# Patient Record
Sex: FEMALE | Race: WHITE
[De-identification: names, ages, dates, MRNs, and addresses within clinical notes are randomized per-mention and may not be internally consistent; named-entity substitution may affect disease eponyms.]

---

## 2017-08-21 ENCOUNTER — HOSPITAL ENCOUNTER (OUTPATIENT)
Dept: HOSPITAL 75 - RAD | Age: 27
End: 2017-08-21
Attending: UROLOGY
Payer: COMMERCIAL

## 2017-08-21 DIAGNOSIS — N20.0: Primary | ICD-10-CM

## 2017-08-21 PROCEDURE — 74000: CPT

## 2017-08-21 NOTE — DIAGNOSTIC IMAGING REPORT
EXAMINATION: Supine abdomen at 3:23 p.m.



INDICATION: Right renal stone.



COMPARISON: There are no prior studies available for comparison.



TECHNIQUE: Two supine views of the abdomen were obtained.



FINDINGS: There is no evidence for a calculus overlying either

kidney. However, both kidneys are partially obscured by bowel gas

and fecal material. There are a few calcific densities in the

pelvis. This includes a 3 mm calcification in the region of the

ureterovesical junction on the right. Whether this calcification

is related to a ureteral stone or to a phlebolith is not certain.

If previous studies are available, they would be helpful for

comparison. If there are no prior exams and further imaging is

desired, then CT would be recommended.



There is no mass or organomegaly appreciated. The osseous

structures are intact.



IMPRESSION:

1. The small calcification low in the pelvis on the right is of

uncertain etiology. Considerations and recommendations as above.

2. There is no evidence for nephrolithiasis but the kidneys are

partially obscured by bowel gas and fecal material.



Dictated by: 



  Dictated on workstation # [id number]

## 2017-08-25 ENCOUNTER — HOSPITAL ENCOUNTER (OUTPATIENT)
Dept: HOSPITAL 75 - PREOP | Age: 27
End: 2017-08-25
Attending: UROLOGY
Payer: COMMERCIAL

## 2017-08-25 VITALS — HEIGHT: 66 IN | BODY MASS INDEX: 22.98 KG/M2 | WEIGHT: 143 LBS

## 2017-08-25 DIAGNOSIS — Z01.818: Primary | ICD-10-CM

## 2017-08-25 DIAGNOSIS — N20.2: ICD-10-CM

## 2017-08-29 ENCOUNTER — HOSPITAL ENCOUNTER (OUTPATIENT)
Dept: HOSPITAL 75 - SDC | Age: 27
Discharge: HOME | End: 2017-08-29
Attending: UROLOGY
Payer: COMMERCIAL

## 2017-08-29 VITALS — SYSTOLIC BLOOD PRESSURE: 108 MMHG | DIASTOLIC BLOOD PRESSURE: 53 MMHG

## 2017-08-29 VITALS — DIASTOLIC BLOOD PRESSURE: 68 MMHG | SYSTOLIC BLOOD PRESSURE: 116 MMHG

## 2017-08-29 VITALS — BODY MASS INDEX: 22.98 KG/M2 | WEIGHT: 143 LBS | HEIGHT: 66 IN

## 2017-08-29 VITALS — SYSTOLIC BLOOD PRESSURE: 112 MMHG | DIASTOLIC BLOOD PRESSURE: 59 MMHG

## 2017-08-29 VITALS — DIASTOLIC BLOOD PRESSURE: 52 MMHG | SYSTOLIC BLOOD PRESSURE: 96 MMHG

## 2017-08-29 VITALS — DIASTOLIC BLOOD PRESSURE: 53 MMHG | SYSTOLIC BLOOD PRESSURE: 108 MMHG

## 2017-08-29 DIAGNOSIS — N20.2: Primary | ICD-10-CM

## 2017-08-29 PROCEDURE — 87081 CULTURE SCREEN ONLY: CPT

## 2017-08-29 PROCEDURE — 84703 CHORIONIC GONADOTROPIN ASSAY: CPT

## 2017-08-29 PROCEDURE — 74000: CPT

## 2017-08-29 RX ADMIN — PROMETHAZINE HYDROCHLORIDE PRN MG: 25 INJECTION INTRAMUSCULAR; INTRAVENOUS at 09:06

## 2017-08-29 RX ADMIN — PROMETHAZINE HYDROCHLORIDE PRN MG: 25 INJECTION INTRAMUSCULAR; INTRAVENOUS at 09:15

## 2017-08-29 NOTE — OPERATIVE REPORT
DATE OF SERVICE:  08/29/2017



PREOPERATIVE DIAGNOSES: 

1.  Right renal stone. 

2.  Possible right distal ureteral stone. 



POSTOPERATIVE DIAGNOSES: 

1.  Right renal stone. 

2.  Possible right distal ureteral stone. 



OPERATION PERFORMED:

Right ureteroscopy and right ESWL.



SURGEON:

Elias Tawil, MD 



ANESTHESIA:

General.



COMPLICATIONS: 

None. 



PROCEDURE: 

Under satisfactory general anesthesia, the patient in a comfortable position on

the cystoscopy table, the genitalia were prepped and draped in the usual sterile

fashion. The cystoscope was introduced under direct vision. The cystoscopy was

negative. Using the foreoblique lens, I dilated the right ureteral artery in the

middle portion to accommodate the 6.9 Egyptian semirigid ureteroscope, went up to

the proximal ureter, back and down and there were no stones found. The

ureteroscope was then removed. The cystoscope  were used to empty the

bladder and the patient was moved to the ESWL bed, supine. 



The right renal stone localized. Shocks were delivered at 1500, kV of 5 until we

could not visualize the stone. The patient received 30 mg of Toradol and 40 mg

of Lasix at the end of the procedure. She tolerated the procedure and anesthesia

well and was sent to the recovery room in stable condition.





Job ID: [id number]

DocumentID: [id number]

Dictated Date:  08/29/2017 08:39:14

Transcription Date: 08/29/2017 08:54:44

Dictated By: ELIAS TAWIL, MD

[facility]

## 2017-08-29 NOTE — PROGRESS NOTE-POST OPERATIVE
Post-Operative Progess Note


Surgeon (s)/Assistant (s)


Surgeon


ELIAS TAWIL MD


Assistant:  N/A





Pre-Operative Diagnosis


RT RENAL STONE, POSSIBLE RT URETERAL STONE





Post-Operative Diagnosis





SAME





Procedure & Operative Findings


Date of Procedure


8/29/17


Procedure Performed/Findings


RT URETEROSCOPY AND RT ESWL


Anesthesia Type


GENERAL





Estimated Blood Loss


Estimated blood loss (mL):  N/A





Specimens/Packing


Specimens Removed


N/A


Packing:  


N/A











TAWIL,ELIAS A MD Aug 29, 2017 8:19 am

## 2017-08-29 NOTE — DIAGNOSTIC IMAGING REPORT
INDICATION: Lithotripsy



COMPARISON: 8/21/2017



FINDINGS: A single frontal view of the abdomen is obtained. There

are faint calcifications over the mid to upper pole of the right

kidney which is similar to the prior study. A 2 mm calcification

in the right pelvis could represent a phlebolith or could be

within the distal right ureter. No additional urinary tract

calcifications are suspected. There is scattered colonic gas and

stool. The bowel gas pattern is otherwise unremarkable. The

osseous structures appear unremarkable.



IMPRESSION: Overall no interval change. There are a couple of

calcifications seen over the right kidney again which are

suspicious for renal stones. Indeterminate 2 mm calcification

right pelvis is unchanged and could be a phlebolith or within the

distal right ureter. No significant interval change or new

abnormality is seen when compared to the recent prior exam.



Dictated by: 



  Dictated on workstation # [id number]

## 2017-08-29 NOTE — DISCHARGE INST-UROLOGY
Discharge Inst-Urology


Discharge Medications


New, Converted, or Re-newed RX:  RX on Chart





Patient Instructions/Follow Up


Plan


Please make appointment to been seen in office in 4 weeks.





Post ESWL instructions





Increase oral fluids for 48 hours and then as needed.





Diet and Activity as tolerated.





If questions or concerns contact your physician 


Or seek help at emergency department.











TAWIL,ELIAS A MD Aug 29, 2017 8:17 am

## 2019-01-17 ENCOUNTER — HOSPITAL ENCOUNTER (INPATIENT)
Dept: HOSPITAL 75 - LDRP | Age: 29
LOS: 2 days | Discharge: HOME | End: 2019-01-19
Attending: FAMILY MEDICINE | Admitting: FAMILY MEDICINE
Payer: COMMERCIAL

## 2019-01-17 VITALS — DIASTOLIC BLOOD PRESSURE: 60 MMHG | SYSTOLIC BLOOD PRESSURE: 116 MMHG

## 2019-01-17 VITALS — SYSTOLIC BLOOD PRESSURE: 118 MMHG | DIASTOLIC BLOOD PRESSURE: 64 MMHG

## 2019-01-17 VITALS — SYSTOLIC BLOOD PRESSURE: 115 MMHG | DIASTOLIC BLOOD PRESSURE: 56 MMHG

## 2019-01-17 VITALS — DIASTOLIC BLOOD PRESSURE: 61 MMHG | SYSTOLIC BLOOD PRESSURE: 127 MMHG

## 2019-01-17 VITALS — SYSTOLIC BLOOD PRESSURE: 119 MMHG | DIASTOLIC BLOOD PRESSURE: 58 MMHG

## 2019-01-17 VITALS — SYSTOLIC BLOOD PRESSURE: 111 MMHG | DIASTOLIC BLOOD PRESSURE: 58 MMHG

## 2019-01-17 VITALS — SYSTOLIC BLOOD PRESSURE: 111 MMHG | DIASTOLIC BLOOD PRESSURE: 56 MMHG

## 2019-01-17 VITALS — SYSTOLIC BLOOD PRESSURE: 120 MMHG | DIASTOLIC BLOOD PRESSURE: 75 MMHG

## 2019-01-17 VITALS — DIASTOLIC BLOOD PRESSURE: 63 MMHG | SYSTOLIC BLOOD PRESSURE: 117 MMHG

## 2019-01-17 VITALS — SYSTOLIC BLOOD PRESSURE: 116 MMHG | DIASTOLIC BLOOD PRESSURE: 58 MMHG

## 2019-01-17 VITALS — SYSTOLIC BLOOD PRESSURE: 124 MMHG | DIASTOLIC BLOOD PRESSURE: 71 MMHG

## 2019-01-17 VITALS — DIASTOLIC BLOOD PRESSURE: 59 MMHG | SYSTOLIC BLOOD PRESSURE: 110 MMHG

## 2019-01-17 VITALS — DIASTOLIC BLOOD PRESSURE: 61 MMHG | SYSTOLIC BLOOD PRESSURE: 130 MMHG

## 2019-01-17 VITALS — DIASTOLIC BLOOD PRESSURE: 61 MMHG | SYSTOLIC BLOOD PRESSURE: 104 MMHG

## 2019-01-17 VITALS — WEIGHT: 172 LBS | BODY MASS INDEX: 27.64 KG/M2 | HEIGHT: 66 IN

## 2019-01-17 VITALS — DIASTOLIC BLOOD PRESSURE: 55 MMHG | SYSTOLIC BLOOD PRESSURE: 108 MMHG

## 2019-01-17 VITALS — DIASTOLIC BLOOD PRESSURE: 63 MMHG | SYSTOLIC BLOOD PRESSURE: 115 MMHG

## 2019-01-17 VITALS — SYSTOLIC BLOOD PRESSURE: 145 MMHG | DIASTOLIC BLOOD PRESSURE: 73 MMHG

## 2019-01-17 VITALS — SYSTOLIC BLOOD PRESSURE: 115 MMHG | DIASTOLIC BLOOD PRESSURE: 68 MMHG

## 2019-01-17 VITALS — DIASTOLIC BLOOD PRESSURE: 60 MMHG | SYSTOLIC BLOOD PRESSURE: 105 MMHG

## 2019-01-17 VITALS — DIASTOLIC BLOOD PRESSURE: 67 MMHG | SYSTOLIC BLOOD PRESSURE: 118 MMHG

## 2019-01-17 VITALS — SYSTOLIC BLOOD PRESSURE: 125 MMHG | DIASTOLIC BLOOD PRESSURE: 69 MMHG

## 2019-01-17 VITALS — DIASTOLIC BLOOD PRESSURE: 80 MMHG | SYSTOLIC BLOOD PRESSURE: 125 MMHG

## 2019-01-17 VITALS — DIASTOLIC BLOOD PRESSURE: 57 MMHG | SYSTOLIC BLOOD PRESSURE: 109 MMHG

## 2019-01-17 VITALS — DIASTOLIC BLOOD PRESSURE: 64 MMHG | SYSTOLIC BLOOD PRESSURE: 118 MMHG

## 2019-01-17 VITALS — SYSTOLIC BLOOD PRESSURE: 109 MMHG | DIASTOLIC BLOOD PRESSURE: 58 MMHG

## 2019-01-17 VITALS — DIASTOLIC BLOOD PRESSURE: 84 MMHG | SYSTOLIC BLOOD PRESSURE: 119 MMHG

## 2019-01-17 VITALS — SYSTOLIC BLOOD PRESSURE: 109 MMHG | DIASTOLIC BLOOD PRESSURE: 63 MMHG

## 2019-01-17 VITALS — SYSTOLIC BLOOD PRESSURE: 117 MMHG | DIASTOLIC BLOOD PRESSURE: 57 MMHG

## 2019-01-17 VITALS — DIASTOLIC BLOOD PRESSURE: 60 MMHG | SYSTOLIC BLOOD PRESSURE: 111 MMHG

## 2019-01-17 VITALS — DIASTOLIC BLOOD PRESSURE: 75 MMHG | SYSTOLIC BLOOD PRESSURE: 123 MMHG

## 2019-01-17 VITALS — SYSTOLIC BLOOD PRESSURE: 129 MMHG | DIASTOLIC BLOOD PRESSURE: 67 MMHG

## 2019-01-17 VITALS — DIASTOLIC BLOOD PRESSURE: 56 MMHG | SYSTOLIC BLOOD PRESSURE: 113 MMHG

## 2019-01-17 VITALS — SYSTOLIC BLOOD PRESSURE: 107 MMHG | DIASTOLIC BLOOD PRESSURE: 56 MMHG

## 2019-01-17 VITALS — SYSTOLIC BLOOD PRESSURE: 126 MMHG | DIASTOLIC BLOOD PRESSURE: 59 MMHG

## 2019-01-17 VITALS — DIASTOLIC BLOOD PRESSURE: 63 MMHG | SYSTOLIC BLOOD PRESSURE: 120 MMHG

## 2019-01-17 VITALS — SYSTOLIC BLOOD PRESSURE: 114 MMHG | DIASTOLIC BLOOD PRESSURE: 57 MMHG

## 2019-01-17 VITALS — DIASTOLIC BLOOD PRESSURE: 67 MMHG | SYSTOLIC BLOOD PRESSURE: 125 MMHG

## 2019-01-17 VITALS — SYSTOLIC BLOOD PRESSURE: 129 MMHG | DIASTOLIC BLOOD PRESSURE: 63 MMHG

## 2019-01-17 VITALS — SYSTOLIC BLOOD PRESSURE: 112 MMHG | DIASTOLIC BLOOD PRESSURE: 62 MMHG

## 2019-01-17 VITALS — DIASTOLIC BLOOD PRESSURE: 57 MMHG | SYSTOLIC BLOOD PRESSURE: 106 MMHG

## 2019-01-17 VITALS — DIASTOLIC BLOOD PRESSURE: 62 MMHG | SYSTOLIC BLOOD PRESSURE: 114 MMHG

## 2019-01-17 VITALS — DIASTOLIC BLOOD PRESSURE: 57 MMHG | SYSTOLIC BLOOD PRESSURE: 115 MMHG

## 2019-01-17 VITALS — SYSTOLIC BLOOD PRESSURE: 130 MMHG | DIASTOLIC BLOOD PRESSURE: 64 MMHG

## 2019-01-17 VITALS — SYSTOLIC BLOOD PRESSURE: 108 MMHG | DIASTOLIC BLOOD PRESSURE: 58 MMHG

## 2019-01-17 VITALS — SYSTOLIC BLOOD PRESSURE: 114 MMHG | DIASTOLIC BLOOD PRESSURE: 59 MMHG

## 2019-01-17 VITALS — SYSTOLIC BLOOD PRESSURE: 142 MMHG | DIASTOLIC BLOOD PRESSURE: 84 MMHG

## 2019-01-17 VITALS — DIASTOLIC BLOOD PRESSURE: 71 MMHG | SYSTOLIC BLOOD PRESSURE: 121 MMHG

## 2019-01-17 VITALS — SYSTOLIC BLOOD PRESSURE: 115 MMHG | DIASTOLIC BLOOD PRESSURE: 82 MMHG

## 2019-01-17 VITALS — SYSTOLIC BLOOD PRESSURE: 113 MMHG | DIASTOLIC BLOOD PRESSURE: 58 MMHG

## 2019-01-17 VITALS — SYSTOLIC BLOOD PRESSURE: 112 MMHG | DIASTOLIC BLOOD PRESSURE: 54 MMHG

## 2019-01-17 VITALS — SYSTOLIC BLOOD PRESSURE: 122 MMHG | DIASTOLIC BLOOD PRESSURE: 72 MMHG

## 2019-01-17 VITALS — SYSTOLIC BLOOD PRESSURE: 117 MMHG | DIASTOLIC BLOOD PRESSURE: 61 MMHG

## 2019-01-17 VITALS — DIASTOLIC BLOOD PRESSURE: 59 MMHG | SYSTOLIC BLOOD PRESSURE: 108 MMHG

## 2019-01-17 VITALS — DIASTOLIC BLOOD PRESSURE: 68 MMHG | SYSTOLIC BLOOD PRESSURE: 119 MMHG

## 2019-01-17 VITALS — DIASTOLIC BLOOD PRESSURE: 61 MMHG | SYSTOLIC BLOOD PRESSURE: 117 MMHG

## 2019-01-17 DIAGNOSIS — Z3A.39: ICD-10-CM

## 2019-01-17 DIAGNOSIS — D72.829: ICD-10-CM

## 2019-01-17 LAB
BASOPHILS # BLD AUTO: 0 10^3/UL (ref 0–0.1)
BASOPHILS NFR BLD AUTO: 0 % (ref 0–10)
EOSINOPHIL # BLD AUTO: 0.2 10^3/UL (ref 0–0.3)
EOSINOPHIL NFR BLD AUTO: 2 % (ref 0–10)
ERYTHROCYTE [DISTWIDTH] IN BLOOD BY AUTOMATED COUNT: 13.5 % (ref 10–14.5)
HCT VFR BLD CALC: 32 % (ref 35–52)
HGB BLD-MCNC: 11.1 G/DL (ref 11.5–16)
LYMPHOCYTES # BLD AUTO: 2.5 X 10^3 (ref 1–4)
LYMPHOCYTES NFR BLD AUTO: 22 % (ref 12–44)
MANUAL DIFFERENTIAL PERFORMED BLD QL: NO
MCH RBC QN AUTO: 29 PG (ref 25–34)
MCHC RBC AUTO-ENTMCNC: 34 G/DL (ref 32–36)
MCV RBC AUTO: 84 FL (ref 80–99)
MONOCYTES # BLD AUTO: 0.9 X 10^3 (ref 0–1)
MONOCYTES NFR BLD AUTO: 8 % (ref 0–12)
NEUTROPHILS # BLD AUTO: 7.8 X 10^3 (ref 1.8–7.8)
NEUTROPHILS NFR BLD AUTO: 68 % (ref 42–75)
PLATELET # BLD: 289 10^3/UL (ref 130–400)
PMV BLD AUTO: 9.3 FL (ref 7.4–10.4)
RBC # BLD AUTO: 3.85 10^6/UL (ref 4.35–5.85)
WBC # BLD AUTO: 11.5 10^3/UL (ref 4.3–11)

## 2019-01-17 PROCEDURE — 86850 RBC ANTIBODY SCREEN: CPT

## 2019-01-17 PROCEDURE — 86900 BLOOD TYPING SEROLOGIC ABO: CPT

## 2019-01-17 PROCEDURE — 3E033VJ INTRODUCTION OF OTHER HORMONE INTO PERIPHERAL VEIN, PERCUTANEOUS APPROACH: ICD-10-PCS | Performed by: FAMILY MEDICINE

## 2019-01-17 PROCEDURE — 85007 BL SMEAR W/DIFF WBC COUNT: CPT

## 2019-01-17 PROCEDURE — 0KQM0ZZ REPAIR PERINEUM MUSCLE, OPEN APPROACH: ICD-10-PCS | Performed by: FAMILY MEDICINE

## 2019-01-17 PROCEDURE — 85025 COMPLETE CBC W/AUTO DIFF WBC: CPT

## 2019-01-17 PROCEDURE — 36415 COLL VENOUS BLD VENIPUNCTURE: CPT

## 2019-01-17 PROCEDURE — 86901 BLOOD TYPING SEROLOGIC RH(D): CPT

## 2019-01-17 PROCEDURE — 85027 COMPLETE CBC AUTOMATED: CPT

## 2019-01-17 RX ADMIN — SODIUM CHLORIDE, SODIUM LACTATE, POTASSIUM CHLORIDE, CALCIUM CHLORIDE, AND DEXTROSE MONOHYDRATE SCH MLS/HR: 600; 310; 30; 20; 5 INJECTION, SOLUTION INTRAVENOUS at 16:35

## 2019-01-17 RX ADMIN — SODIUM CHLORIDE, SODIUM LACTATE, POTASSIUM CHLORIDE, CALCIUM CHLORIDE, AND DEXTROSE MONOHYDRATE SCH MLS/HR: 600; 310; 30; 20; 5 INJECTION, SOLUTION INTRAVENOUS at 08:47

## 2019-01-17 RX ADMIN — IBUPROFEN SCH MG: 600 TABLET ORAL at 22:52

## 2019-01-17 NOTE — HISTORY & PHYSICAL-OB
OB - Chief Complaint & HPI


Date/Time


Date of Admission:


Date of Admission:  2019 at 07:12


Date seen by a Provider:  2019


Time Seen by a Provider:  09:21





Chief Complaint/History


OB-Reason for Admission/Chief:  Induction of Labor


Hx :  4


Hx Para:  3


Expected Date of Delivery:  2019


Gestational Age in Weeks:  39


Gestational Age in Days:  1


Indication for induction:  maternal discomfort





Allergies and Home Medications


Allergies


Coded Allergies:  


     No Known Drug Allergies (Unverified , 17)





Home Medications


Hydrocodone Bit/Acetaminophen 1 Each Tablet, 1 EACH PO Q4H PRN for PAIN, (

Reported)


Multivitamin 1 Each Tablet, 1 EACH PO DAILY, (Reported)


Nitrofurantoin Monohyd/M-Cryst 100 Mg Capsule, 1 TAB PO BID


   Prescribed by: JAYLEN MEHTA on 17 1114


Tamsulosin HCl 0.4 Mg Cap, 0.4 MG PO DAILY


   Prescribed by: JAYLEN MEHTA on 17 1114





Patient Home Medication List


Home Medication List Reviewed:  Yes





OB - History


Hx of Present Pregnancy


Prenatal Care:  Yes


Ultrasounds:  Normal mid trimester US


Obstetrical Complications:  None


Medical Complications:  None





Delivery History


Hx Blood Disorders:  No


Adverse Rxn to Tranfusion:  No (N/A)





Patient Past Medical History





Healthy; term vaginal deliveries.





Social History/Family History


HIV/AIDS:  Yes


Recent Infectious Disease Expo:  No


Sexually Transmitted Disease:  Yes (HPV)


Alcohol Use:  Denies Use


Recreational Drug Use:  No





Immunizations


Hepatitis A:  Yes


Hepatitis B:  Yes





OB - Admission Exam


Physical Exam


HEENT:  NCAT


Heart:  Rhythm Normal


Lungs:  Clear


Abdomen:  Gravid


Extremities:  Normal


Reflexes:  Normal


Cervical Dilatation:  1cm


Effacement:  50%


Station:  Ballotable


Membranes:  Intact


Amniotic Fluid:  Unevaluable


Fetal Heart Rate:  130's


Accelerations:  Accelerations Present


Decelerations:  No Decelerations


Short Term Variability:  Present


Long Term Variability:  Average (6-25)


Contractions on Admission:  < 5 Minutes Apart


Intensity:  Mild





García Scoring Tool (Modified)


Dilation (cm):  1-2cm (1)


Effacement (%):  31-51%  (1)


Fetal Descent/Station:  -3        (0)


Cervix Consistency:  Medium(1)


Cervix Position:  Middle/Mid-Position  (1)


Labs





Laboratory Tests








Test


 19


07:50 Range/Units


 


 


White Blood Count


 11.5 H


 4.3-11.0


10^3/uL


 


Red Blood Count


 3.85 L


 4.35-5.85


10^6/uL


 


Hemoglobin 11.1 L 11.5-16.0  G/DL


 


Hematocrit 32 L 35-52  %


 


Mean Corpuscular Volume 84  80-99  FL


 


Mean Corpuscular Hemoglobin 29  25-34  PG


 


Mean Corpuscular Hemoglobin


Concent 34 


 32-36  G/DL





 


Red Cell Distribution Width 13.5  10.0-14.5  %


 


Platelet Count


 289 


 130-400


10^3/uL


 


Mean Platelet Volume 9.3  7.4-10.4  FL


 


Neutrophils (%) (Auto) 68  42-75  %


 


Lymphocytes (%) (Auto) 22  12-44  %


 


Monocytes (%) (Auto) 8  0-12  %


 


Eosinophils (%) (Auto) 2  0-10  %


 


Basophils (%) (Auto) 0  0-10  %


 


Neutrophils # (Auto) 7.8  1.8-7.8  X 10^3


 


Lymphocytes # (Auto) 2.5  1.0-4.0  X 10^3


 


Monocytes # (Auto) 0.9  0.0-1.0  X 10^3


 


Eosinophils # (Auto)


 0.2 


 0.0-0.3


10^3/uL


 


Basophils # (Auto)


 0.0 


 0.0-0.1


10^3/uL











OB - Assessment/Plan/Diagnosis


Assessment


Assessment:  induction of labor


Admission Dx


Induction of labor.


Admission Status:  Inpatient Order (span 2 midnights)


Reason for Inpatient Admission:  


Induction of labor.





Plan


Plan:  Induction


Induction Method:  per Pitocin Protocol





Discharge Diagnosis


Diagnosis:  


Attempt at AROM.  Not much fluid in front of head.  Unable to evaluated


fluid.











YONG DHILLON MD 2019 09:25

## 2019-01-17 NOTE — OB LABOR & DELIVERY RECORD
Vag Delivery Note


Vag Delivery Note


Date of Delivery: 19 





Preoperative Diagnosis: Stefany Garcia  is a (30  /Para  4 / 3,

Gestational Age (wks)39with [induction of labor]





Postoperative Diagnosis: Same


Surgeon: YONG DHILLON 





Assistant: []





Anesthesia: [epidural]





Delivery Type: []





Findings: []


Viable [male] infant, apgars [8/9], weight [7 pounds 2 ounces]


Lacerations: 2nd degree perineal laceration and right labial tear


Intact placenta with 3 vessel cord. Loose nuchal cord, body cord or shoulder 

dystocia








Estimated Blood Loss: [200] ml





Complications: None





Condition: Stable





Description of Procedure:





The patient is a [G4 now P4]who presented [for induction of labor]. She was 

admitted and informed consent was obtained. Her labor course was remarkable for 

[nothing] She progressed to complete dilatation and began to push. 





She was then set up for delivery. The infant's head was delivered 

atraumatically in the [OA] position. The shoulders and remainder of the infant'

s body were then delivered without difficulty. Upon delivery, the head was held 

below the level of the perineum and the mouth and nares were bulb suctioned. 

The cord was doubly clamped and cut after 60 seconds and placed on maternal 

abdomen. An intact placenta with 3-vessel cord delivered via [place] and there 

was found to be minimal bleeding.~ Vigorous fundal massage was performed and 

the fundus was found to be firm. IV oxytocin was given. Examination of the 

vagina and perineum revealed a [2nd degree perineal laceration and right labial

] laceration repaired in the usual fashion with 3-0 vicryl suture. Following 

the repair, sponge, instrument and needle counts were correct. Mom and baby 

were both in stable condition in the labor suite.





Vitals - Labs


Vital Signs - I&O





Vital Signs








  Date Time  Temp Pulse Resp B/P (MAP) Pulse Ox O2 Delivery O2 Flow Rate FiO2


 


19 19:50  83 18 108/58 (75) 99 Room Air  


 


19 19:35  99 18 124/71 (88) 100 Room Air  


 


19 19:20  86 18 110/59 (76) 99 Room Air  


 


19 19:07  94 18 111/58 (75) 100 Room Air  


 


19 18:51  106 18 120/75 (90) 100 Room Air  


 


19 18:35  88 18 115/56 (75) 100 Room Air  


 


19 18:30  92 18 118/67 (84) 100 Room Air  


 


19 18:23  89 18 111/56 (74) 99 Room Air  


 


19 18:18  96 18 117/61 (79) 100 Room Air  


 


19 18:13  100 18 117/63 (81) 100 Room Air  


 


19 18:08  99 18 118/64 (82) 100 Room Air  


 


19 18:03  95 18 115/68 (84) 100 Room Air  


 


19 17:58  105 18 122/72 (89) 98 Room Air  


 


19 17:54  98 18 115/57 (76) 100 Room Air  


 


19 17:48  93 18 130/64 (86) 100 Room Air  


 


19 17:45  100 18 123/75 (91) 97 Room Air  


 


19 17:36 97.4 102 18 125/80 (95) 99 Room Air  


 


19 17:20  93 18 119/84 (96)    


 


19 17:05  96 18 107/56 (73)    


 


19 16:50  92 18 115/56 (75)    


 


19 16:35  92 18 126/59 (81)    


 


19 16:20  94 18 129/63 (85)    


 


19 16:05  95 18 119/58 (78)    


 


19 15:50  90 18 117/63 (81)    


 


19 15:35  99 18 121/71 (88)    


 


19 15:20 99.3 93 18 130/61 (84)    


 


19 15:05  93 18 113/58 (76)    


 


19 14:50  91 18 125/67 (86)    


 


19 14:35  100 18 114/62 (79)    


 


19 14:20  95 18 124/71 (88)    


 


19 14:05  95 18 119/68 (85)    


 


19 13:50  93 18 115/57 (76)    


 


19 13:35  90 18 125/69 (87)    


 


19 13:20  92 18 129/67 (87)    


 


19 13:05  90 18 115/63 (80)    


 


19 12:50  88 18 111/60 (77)    


 


19 12:45  90 18 127/61 (83)    


 


19 12:20  90 18 108/55 (72)    


 


19 12:05  92 18 109/57 (74)    


 


19 11:50 98.0 84 18 111/56 (74)    


 


19 11:35  82 18 114/57 (76)    


 


19 11:20  83 18 108/59 (75)    


 


19 11:05 98.9 90 18 105/60 (75)    


 


19 10:50  86 18 109/58 (75)    


 


19 10:35  85 18 106/57 (73)    


 


19 10:20  93 18 116/60 (78)    


 


19 10:05  85 18 104/61 (75)    


 


19 09:50  88 18 112/62 (79)    


 


19 09:35 99.6 88 18 118/64 (82)    


 


19 09:20  88 18 118/64 (82)    


 


19 09:05  88 18 109/63 (78)    


 


19 08:50  83 18 114/59 (77)    


 


19 07:50  94  142/84 (103)    











Labs


Laboratory Tests


19 07:50: 


White Blood Count 11.5H, Red Blood Count 3.85L, Hemoglobin 11.1L, Hematocrit 32L

, Mean Corpuscular Volume 84, Mean Corpuscular Hemoglobin 29, Mean Corpuscular 

Hemoglobin Concent 34, Red Cell Distribution Width 13.5, Platelet Count 289, 

Mean Platelet Volume 9.3, Neutrophils (%) (Auto) 68, Lymphocytes (%) (Auto) 22, 

Monocytes (%) (Auto) 8, Eosinophils (%) (Auto) 2, Basophils (%) (Auto) 0, 

Neutrophils # (Auto) 7.8, Lymphocytes # (Auto) 2.5, Monocytes # (Auto) 0.9, 

Eosinophils # (Auto) 0.2, Basophils # (Auto) 0.0











YONG DHILLON MD 2019 21:25

## 2019-01-17 NOTE — NUR
2123  FF u/0. moderate rubra. 

2129 ivf complete. iv converted to saline lock per orders. 

2135 ff u/0. moderate rubra. infant remains in mom's arms. Pt denies c/o. Warm blanket 
given. 

2206 Pt states feels gush of blood. fundus at +1 and to the right. massaged down. moderate 
bleeding noted. 2 golf ball sized clots expressed. ff u/1. chux and v pad changed. will 
monitor closely. 

2245 Fundus massaged to firm again. one golf ball sized clot noted. pad changed. poli care 
done. Pitocin restarted at 250 ml/hr per pump. 

2330 Pt states no bleeding felt. Mom holding infant. ffu/0. denies needs.

2355 ff u/0. moderate rubra. no clots seen. pad changed.

## 2019-01-17 NOTE — NUR
Arrived to unit for induction of labor.  Wt obtained and to room 319.  Gowned and urine 
sample obtained.  To bed.  Oriented to room, call light and surroundings.  plan of care 
reviewed with pt and s.o. at bedside.

monitors on.  pt reports fetal movement.  denies pain.  denies questions.

## 2019-01-18 VITALS — DIASTOLIC BLOOD PRESSURE: 88 MMHG | SYSTOLIC BLOOD PRESSURE: 124 MMHG

## 2019-01-18 VITALS — SYSTOLIC BLOOD PRESSURE: 112 MMHG | DIASTOLIC BLOOD PRESSURE: 65 MMHG

## 2019-01-18 VITALS — DIASTOLIC BLOOD PRESSURE: 74 MMHG | SYSTOLIC BLOOD PRESSURE: 115 MMHG

## 2019-01-18 VITALS — DIASTOLIC BLOOD PRESSURE: 63 MMHG | SYSTOLIC BLOOD PRESSURE: 122 MMHG

## 2019-01-18 VITALS — DIASTOLIC BLOOD PRESSURE: 53 MMHG | SYSTOLIC BLOOD PRESSURE: 94 MMHG

## 2019-01-18 LAB
BASOPHILS # BLD AUTO: 0 10^3/UL (ref 0–0.1)
BASOPHILS NFR BLD AUTO: 0 % (ref 0–10)
EOSINOPHIL # BLD AUTO: 0.2 10^3/UL (ref 0–0.3)
EOSINOPHIL NFR BLD AUTO: 1 % (ref 0–10)
ERYTHROCYTE [DISTWIDTH] IN BLOOD BY AUTOMATED COUNT: 13.6 % (ref 10–14.5)
HCT VFR BLD CALC: 30 % (ref 35–52)
HGB BLD-MCNC: 10.3 G/DL (ref 11.5–16)
LYMPHOCYTES # BLD AUTO: 3.2 X 10^3 (ref 1–4)
LYMPHOCYTES NFR BLD AUTO: 17 % (ref 12–44)
MANUAL DIFFERENTIAL PERFORMED BLD QL: YES
MCH RBC QN AUTO: 29 PG (ref 25–34)
MCHC RBC AUTO-ENTMCNC: 34 G/DL (ref 32–36)
MCV RBC AUTO: 85 FL (ref 80–99)
MONOCYTES # BLD AUTO: 1.6 X 10^3 (ref 0–1)
MONOCYTES NFR BLD AUTO: 8 % (ref 0–12)
MONOCYTES NFR BLD: 7 %
NEUTROPHILS # BLD AUTO: 14.5 X 10^3 (ref 1.8–7.8)
NEUTROPHILS NFR BLD AUTO: 74 % (ref 42–75)
NEUTS BAND NFR BLD MANUAL: 80 %
PLATELET # BLD: 274 10^3/UL (ref 130–400)
PMV BLD AUTO: 9.5 FL (ref 7.4–10.4)
RBC # BLD AUTO: 3.59 10^6/UL (ref 4.35–5.85)
VARIANT LYMPHS NFR BLD MANUAL: 13 %
WBC # BLD AUTO: 19.6 10^3/UL (ref 4.3–11)

## 2019-01-18 RX ADMIN — VITAMIN A ACETATE, .BETA.-CAROTENE, ASCORBIC ACID, CHOLECALCIFEROL, .ALPHA.-TOCOPHEROL ACETATE, DL-, THIAMINE MONONITRATE, RIBOFLAVIN, NIACINAMIDE, PYRIDOXINE HYDROCHLORIDE, FOLIC ACID, CYANOCOBALAMIN, CALCIUM CARBONATE, FERROUS FUMARATE, ZINC OXIDE, AND CUPRIC OXIDE SCH EA: 2000; 2000; 120; 400; 22; 1.84; 3; 20; 10; 1; 12; 200; 27; 25; 2 TABLET ORAL at 08:18

## 2019-01-18 RX ADMIN — IBUPROFEN SCH MG: 600 TABLET ORAL at 11:17

## 2019-01-18 RX ADMIN — IBUPROFEN SCH MG: 600 TABLET ORAL at 17:46

## 2019-01-18 RX ADMIN — DOCUSATE SODIUM SCH MG: 100 CAPSULE ORAL at 08:18

## 2019-01-18 RX ADMIN — DOCUSATE SODIUM SCH MG: 100 CAPSULE ORAL at 23:00

## 2019-01-18 RX ADMIN — ACETAMINOPHEN PRN MG: 500 TABLET ORAL at 21:21

## 2019-01-18 RX ADMIN — IBUPROFEN SCH MG: 600 TABLET ORAL at 05:30

## 2019-01-18 RX ADMIN — IBUPROFEN SCH MG: 600 TABLET ORAL at 23:00

## 2019-01-18 NOTE — PROGRESS NOTE (SOAP)
Subjective


Subjective/Events-last exam


Infant latched this morning. Bleeding slowing down.  Pain controlled.


Review of Systems


Date Seen by Provider:  2019


Time Seen by Provider:  07:30





Objective


Exam


Last Set of Vital Signs





Vital Signs








  Date Time  Temp Pulse Resp B/P (MAP) Pulse Ox O2 Delivery O2 Flow Rate FiO2


 


19 05:25 97.8 85 16 124/88 (100)    


 


19 22:45      Room Air  


 


19 20:51     100   





Capillary Refill :


I&O











Intake and Output 


 


 19





 00:00


 


Intake Total 2200 ml


 


Balance 2200 ml


 


 


 


Intake IV Total 2200 ml


 


Daily Weight Change No








General:  Alert, Oriented X3


Abdomen:  Other


Other physical findings


fundus firm below umbilicus





Results/Procedures


Lab


Laboratory Tests


19 07:50: 


White Blood Count 11.5H, Red Blood Count 3.85L, Hemoglobin 11.1L, Hematocrit 32L

, Mean Corpuscular Volume 84, Mean Corpuscular Hemoglobin 29, Mean Corpuscular 

Hemoglobin Concent 34, Red Cell Distribution Width 13.5, Platelet Count 289, 

Mean Platelet Volume 9.3, Neutrophils (%) (Auto) 68, Lymphocytes (%) (Auto) 22, 

Monocytes (%) (Auto) 8, Eosinophils (%) (Auto) 2, Basophils (%) (Auto) 0, 

Neutrophils # (Auto) 7.8, Lymphocytes # (Auto) 2.5, Monocytes # (Auto) 0.9, 

Eosinophils # (Auto) 0.2, Basophils # (Auto) 0.0


19 05:25: 


White Blood Count 19.6H, Red Blood Count 3.59L, Hemoglobin 10.3L, Hematocrit 30L

, Mean Corpuscular Volume 85, Mean Corpuscular Hemoglobin 29, Mean Corpuscular 

Hemoglobin Concent 34, Red Cell Distribution Width 13.6, Platelet Count 274, 

Mean Platelet Volume 9.5, Neutrophils (%) (Auto) 74, Lymphocytes (%) (Auto) 17, 

Monocytes (%) (Auto) 8, Eosinophils (%) (Auto) 1, Basophils (%) (Auto) 0, 

Neutrophils # (Auto) 14.5H, Lymphocytes # (Auto) 3.2, Monocytes # (Auto) 1.6H, 

Eosinophils # (Auto) 0.2, Basophils # (Auto) 0.0, Neutrophils % (Manual) 80, 

Lymphocytes % (Manual) 13, Monocytes % (Manual) 7





Assessment/Plan


Assessment/Plan





(1) Postpartum care following vaginal delivery


Status:  Acute


Assessment & Plan:  Patient doing well.  Increased white blood cell count.  No 

signs of infection.  Afebrile.  Monitor.








Clinical Quality Measures


DVT/VTE Risk/Contraindication:


Risk Factor Score Per Nursin


RFS Level Per Nursing on Admit:  1=Low/No VTE PPX











YONG DHILLON MD 2019 07:31

## 2019-01-18 NOTE — NUR
Pt. c/o pain, Tylenol offered & given.  Shift assessment completed, no prob. noted, pt. 
reports having BM, denies needs.  Will re-eval pain.

## 2019-01-18 NOTE — NUR
Pt up walking in room. denies needs. no further heavy bleeding or clots noted. Gown on. 
Transferred to room 307 per ambulatory accompanied by this rn, so, and infant. Oriented to 
room. Call light in reach. denies needs. will monitor.

## 2019-01-18 NOTE — NUR
Pt requesting to use the bathroom. FF u/0. minimal bleeding noted. IV converted to saline 
lock. Pt walked to br. Able to void. Pericare done. Tucks given. Pt up walking around room. 
denies needs. Pt states ready to move to new room anytime.

## 2019-01-18 NOTE — ANESTHESIA-REGIONAL POST-OP
Regional


Patient Condition


Mental Status:  Alert, Oriented x3


Circulation:  Same as Pre-Op


Headache:  Absent


Sensation:  Full Recovery


Motor Block:  Absent





Post Op Complications


Complications


None





Follow Up Care/Instructions


Patient Instructions


None needed.





Anesthesia/Patient Condition


Patient is doing well, no complaints, stable vital signs, no apparent adverse 

anesthesia problems.   


No complications reported per nursing.











ALEXIS MILLAN CRNA Jan 18, 2019 07:43

## 2019-01-19 VITALS — DIASTOLIC BLOOD PRESSURE: 53 MMHG | SYSTOLIC BLOOD PRESSURE: 91 MMHG

## 2019-01-19 VITALS — SYSTOLIC BLOOD PRESSURE: 116 MMHG | DIASTOLIC BLOOD PRESSURE: 68 MMHG

## 2019-01-19 RX ADMIN — VITAMIN A ACETATE, .BETA.-CAROTENE, ASCORBIC ACID, CHOLECALCIFEROL, .ALPHA.-TOCOPHEROL ACETATE, DL-, THIAMINE MONONITRATE, RIBOFLAVIN, NIACINAMIDE, PYRIDOXINE HYDROCHLORIDE, FOLIC ACID, CYANOCOBALAMIN, CALCIUM CARBONATE, FERROUS FUMARATE, ZINC OXIDE, AND CUPRIC OXIDE SCH EA: 2000; 2000; 120; 400; 22; 1.84; 3; 20; 10; 1; 12; 200; 27; 25; 2 TABLET ORAL at 08:20

## 2019-01-19 RX ADMIN — IBUPROFEN SCH MG: 600 TABLET ORAL at 05:04

## 2019-01-19 RX ADMIN — IBUPROFEN SCH MG: 600 TABLET ORAL at 10:57

## 2019-01-19 RX ADMIN — DOCUSATE SODIUM SCH MG: 100 CAPSULE ORAL at 08:20

## 2019-01-19 RX ADMIN — ACETAMINOPHEN PRN MG: 500 TABLET ORAL at 08:20

## 2019-01-19 NOTE — NUR
Discharge instructions explained to pt with copy provided to pt. Pt notified of prescription 
transmitted to pharmacy. Pt verbalizes understanding of teaching. Denies questions or 
concerns at this time. Will give scheduled motrin prior to discharge. Pt planning do bf 
infant before dismissal.

## 2019-01-19 NOTE — DISCHARGE SUMMARY
Diagnosis/Chief Complaint


Date of Admission


2019 at 07:12


Date of Discharge


2019


Admission Diagnosis


Admission Diagnosis


Vaginal delivery at 39 1/7 wga.





Discharge Diagnosis


sp vaginal delivery


Problems/Diagnosis:  


(1) Postpartum care following vaginal delivery


Assessment & Plan:  Patient doing well.  Increased white blood cell count.  No 

signs of infection.  Afebrile.  Monitor.


Status:  Acute





Discharge Summary-OBS


Procedures


None.


Discharge Physical Examination


Allergies:  


Coded Allergies:  


     No Known Drug Allergies (Unverified , 17)


Vitals & I&Os





Vital Sign - Last 12Hours








  Date Time  Temp Pulse Resp B/P (MAP) Pulse Ox O2 Delivery O2 Flow Rate FiO2


 


19 05:00 97.3 61 18 91/53 (66)    


 


19 23:00     98 Room Air  








General Appearance:  Alert, Oriented X3


HEENT:  Atraumatic


Abdominal:  Normal Bowel Sounds, Other (fundus firm below umbilicus)


Psych/Mental Status:  Mental Status NL





Hospital Course


Was the Problem List Reviewed?:  Yes


Patient delivered and had routine postpartum course.





Discharge


Instructions to patient/family


Please see electronic discharge instructions given to patient.


Discharge Medications


Reviewed and agree with Discharge Medication list on patient's Discharge 

Instruction sheet





Clinical Quality Measures


DVT/VTE Risk/Contraindication:


Risk Factor Score Per Nursin


RFS Level Per Nursing on Admit:  1=Low/No VTE PPX











YONG DHILLON MD 2019 07:16

## 2019-01-19 NOTE — DISCHARGE INSTRUCTIONS
Discharge Inst-[facility]


Depart Medications


New, Converted or Re-Newed RX:  Transmitted to Pharmacy


Final Diagnosis


Vaginal delivery.





Follow Up/Instructions


Goal/Follow Up:  


Follow-up with Dr. Silva in 6 weeks.





Activity


Activity:  Activity as Tolerated


Driving Instructions:  You May Drive


NO SMOKING:  NO SMOKING


Nothing Inside Vagina:  No Douching, No Primrose, No Tampons





Diet


Discharge Diet:  No Restrictions


Symptoms to Report to DrKenya:  Bleeding Excessive, Pain Increased, Fever Over 101 

Degrees F, Vaginal Bleeding Increase


For Any Problems or Questions:  Contact Your Physician





Skin/Wound Care


Infection Signs and Symptoms:  Increased Redness, Foul Odor of Wound


Bathing Instructions:  YONG Neal MD Jan 19, 2019 07:13

## 2019-01-19 NOTE — NUR
Pt ambulates self off unit accompanied by infant, RN, and S.O. to private vehicle with all 
personal belongings. No s/s of distress noted.

## 2020-12-18 ENCOUNTER — HOSPITAL ENCOUNTER (OUTPATIENT)
Dept: HOSPITAL 75 - LAB FS | Age: 30
End: 2020-12-18
Attending: FAMILY MEDICINE
Payer: MEDICAID

## 2020-12-18 DIAGNOSIS — Z34.90: Primary | ICD-10-CM

## 2020-12-18 PROCEDURE — 86336 INHIBIN A: CPT

## 2020-12-18 PROCEDURE — 84702 CHORIONIC GONADOTROPIN TEST: CPT

## 2020-12-18 PROCEDURE — 36415 COLL VENOUS BLD VENIPUNCTURE: CPT

## 2020-12-18 PROCEDURE — 82105 ALPHA-FETOPROTEIN SERUM: CPT

## 2021-05-30 ENCOUNTER — HOSPITAL ENCOUNTER (INPATIENT)
Dept: HOSPITAL 75 - WSO | Age: 31
LOS: 2 days | Discharge: HOME | End: 2021-06-01
Attending: OBSTETRICS & GYNECOLOGY | Admitting: OBSTETRICS & GYNECOLOGY
Payer: MEDICAID

## 2021-05-30 VITALS — SYSTOLIC BLOOD PRESSURE: 127 MMHG | DIASTOLIC BLOOD PRESSURE: 74 MMHG

## 2021-05-30 VITALS — DIASTOLIC BLOOD PRESSURE: 56 MMHG | SYSTOLIC BLOOD PRESSURE: 110 MMHG

## 2021-05-30 VITALS — SYSTOLIC BLOOD PRESSURE: 124 MMHG | DIASTOLIC BLOOD PRESSURE: 76 MMHG

## 2021-05-30 VITALS — DIASTOLIC BLOOD PRESSURE: 59 MMHG | SYSTOLIC BLOOD PRESSURE: 105 MMHG

## 2021-05-30 VITALS — SYSTOLIC BLOOD PRESSURE: 96 MMHG | DIASTOLIC BLOOD PRESSURE: 52 MMHG

## 2021-05-30 VITALS — DIASTOLIC BLOOD PRESSURE: 69 MMHG | SYSTOLIC BLOOD PRESSURE: 118 MMHG

## 2021-05-30 VITALS — SYSTOLIC BLOOD PRESSURE: 122 MMHG | DIASTOLIC BLOOD PRESSURE: 69 MMHG

## 2021-05-30 VITALS — SYSTOLIC BLOOD PRESSURE: 115 MMHG | DIASTOLIC BLOOD PRESSURE: 59 MMHG

## 2021-05-30 VITALS — DIASTOLIC BLOOD PRESSURE: 60 MMHG | SYSTOLIC BLOOD PRESSURE: 116 MMHG

## 2021-05-30 VITALS — DIASTOLIC BLOOD PRESSURE: 62 MMHG | SYSTOLIC BLOOD PRESSURE: 116 MMHG

## 2021-05-30 VITALS — SYSTOLIC BLOOD PRESSURE: 121 MMHG | DIASTOLIC BLOOD PRESSURE: 58 MMHG

## 2021-05-30 VITALS — DIASTOLIC BLOOD PRESSURE: 59 MMHG | SYSTOLIC BLOOD PRESSURE: 100 MMHG

## 2021-05-30 VITALS — WEIGHT: 190.48 LBS | HEIGHT: 66.02 IN | BODY MASS INDEX: 30.61 KG/M2

## 2021-05-30 VITALS — SYSTOLIC BLOOD PRESSURE: 114 MMHG | DIASTOLIC BLOOD PRESSURE: 63 MMHG

## 2021-05-30 VITALS — DIASTOLIC BLOOD PRESSURE: 63 MMHG | SYSTOLIC BLOOD PRESSURE: 110 MMHG

## 2021-05-30 VITALS — SYSTOLIC BLOOD PRESSURE: 119 MMHG | DIASTOLIC BLOOD PRESSURE: 60 MMHG

## 2021-05-30 VITALS — DIASTOLIC BLOOD PRESSURE: 58 MMHG | SYSTOLIC BLOOD PRESSURE: 115 MMHG

## 2021-05-30 VITALS — DIASTOLIC BLOOD PRESSURE: 59 MMHG | SYSTOLIC BLOOD PRESSURE: 114 MMHG

## 2021-05-30 VITALS — DIASTOLIC BLOOD PRESSURE: 90 MMHG | SYSTOLIC BLOOD PRESSURE: 125 MMHG

## 2021-05-30 VITALS — SYSTOLIC BLOOD PRESSURE: 122 MMHG | DIASTOLIC BLOOD PRESSURE: 66 MMHG

## 2021-05-30 VITALS — SYSTOLIC BLOOD PRESSURE: 108 MMHG | DIASTOLIC BLOOD PRESSURE: 60 MMHG

## 2021-05-30 VITALS — SYSTOLIC BLOOD PRESSURE: 123 MMHG | DIASTOLIC BLOOD PRESSURE: 59 MMHG

## 2021-05-30 VITALS — SYSTOLIC BLOOD PRESSURE: 119 MMHG | DIASTOLIC BLOOD PRESSURE: 67 MMHG

## 2021-05-30 VITALS — SYSTOLIC BLOOD PRESSURE: 123 MMHG | DIASTOLIC BLOOD PRESSURE: 66 MMHG

## 2021-05-30 VITALS — DIASTOLIC BLOOD PRESSURE: 68 MMHG | SYSTOLIC BLOOD PRESSURE: 137 MMHG

## 2021-05-30 VITALS — DIASTOLIC BLOOD PRESSURE: 55 MMHG | SYSTOLIC BLOOD PRESSURE: 99 MMHG

## 2021-05-30 VITALS — SYSTOLIC BLOOD PRESSURE: 128 MMHG | DIASTOLIC BLOOD PRESSURE: 69 MMHG

## 2021-05-30 VITALS — DIASTOLIC BLOOD PRESSURE: 87 MMHG | SYSTOLIC BLOOD PRESSURE: 136 MMHG

## 2021-05-30 VITALS — DIASTOLIC BLOOD PRESSURE: 68 MMHG | SYSTOLIC BLOOD PRESSURE: 118 MMHG

## 2021-05-30 VITALS — SYSTOLIC BLOOD PRESSURE: 133 MMHG | DIASTOLIC BLOOD PRESSURE: 58 MMHG

## 2021-05-30 VITALS — SYSTOLIC BLOOD PRESSURE: 113 MMHG | DIASTOLIC BLOOD PRESSURE: 57 MMHG

## 2021-05-30 VITALS — SYSTOLIC BLOOD PRESSURE: 118 MMHG | DIASTOLIC BLOOD PRESSURE: 66 MMHG

## 2021-05-30 VITALS — DIASTOLIC BLOOD PRESSURE: 71 MMHG | SYSTOLIC BLOOD PRESSURE: 113 MMHG

## 2021-05-30 VITALS — DIASTOLIC BLOOD PRESSURE: 57 MMHG | SYSTOLIC BLOOD PRESSURE: 126 MMHG

## 2021-05-30 VITALS — SYSTOLIC BLOOD PRESSURE: 126 MMHG | DIASTOLIC BLOOD PRESSURE: 77 MMHG

## 2021-05-30 VITALS — SYSTOLIC BLOOD PRESSURE: 118 MMHG | DIASTOLIC BLOOD PRESSURE: 56 MMHG

## 2021-05-30 VITALS — SYSTOLIC BLOOD PRESSURE: 128 MMHG | DIASTOLIC BLOOD PRESSURE: 68 MMHG

## 2021-05-30 VITALS — SYSTOLIC BLOOD PRESSURE: 115 MMHG | DIASTOLIC BLOOD PRESSURE: 62 MMHG

## 2021-05-30 VITALS — SYSTOLIC BLOOD PRESSURE: 102 MMHG | DIASTOLIC BLOOD PRESSURE: 58 MMHG

## 2021-05-30 VITALS — DIASTOLIC BLOOD PRESSURE: 64 MMHG | SYSTOLIC BLOOD PRESSURE: 110 MMHG

## 2021-05-30 VITALS — DIASTOLIC BLOOD PRESSURE: 72 MMHG | SYSTOLIC BLOOD PRESSURE: 121 MMHG

## 2021-05-30 VITALS — DIASTOLIC BLOOD PRESSURE: 72 MMHG | SYSTOLIC BLOOD PRESSURE: 126 MMHG

## 2021-05-30 VITALS — SYSTOLIC BLOOD PRESSURE: 117 MMHG | DIASTOLIC BLOOD PRESSURE: 61 MMHG

## 2021-05-30 VITALS — DIASTOLIC BLOOD PRESSURE: 68 MMHG | SYSTOLIC BLOOD PRESSURE: 129 MMHG

## 2021-05-30 VITALS — SYSTOLIC BLOOD PRESSURE: 119 MMHG | DIASTOLIC BLOOD PRESSURE: 74 MMHG

## 2021-05-30 VITALS — SYSTOLIC BLOOD PRESSURE: 110 MMHG | DIASTOLIC BLOOD PRESSURE: 56 MMHG

## 2021-05-30 VITALS — SYSTOLIC BLOOD PRESSURE: 112 MMHG | DIASTOLIC BLOOD PRESSURE: 70 MMHG

## 2021-05-30 VITALS — DIASTOLIC BLOOD PRESSURE: 70 MMHG | SYSTOLIC BLOOD PRESSURE: 119 MMHG

## 2021-05-30 VITALS — DIASTOLIC BLOOD PRESSURE: 59 MMHG | SYSTOLIC BLOOD PRESSURE: 119 MMHG

## 2021-05-30 VITALS — SYSTOLIC BLOOD PRESSURE: 108 MMHG | DIASTOLIC BLOOD PRESSURE: 59 MMHG

## 2021-05-30 VITALS — DIASTOLIC BLOOD PRESSURE: 57 MMHG | SYSTOLIC BLOOD PRESSURE: 112 MMHG

## 2021-05-30 VITALS — SYSTOLIC BLOOD PRESSURE: 139 MMHG | DIASTOLIC BLOOD PRESSURE: 79 MMHG

## 2021-05-30 VITALS — DIASTOLIC BLOOD PRESSURE: 68 MMHG | SYSTOLIC BLOOD PRESSURE: 134 MMHG

## 2021-05-30 VITALS — DIASTOLIC BLOOD PRESSURE: 59 MMHG | SYSTOLIC BLOOD PRESSURE: 113 MMHG

## 2021-05-30 VITALS — DIASTOLIC BLOOD PRESSURE: 69 MMHG | SYSTOLIC BLOOD PRESSURE: 114 MMHG

## 2021-05-30 VITALS — DIASTOLIC BLOOD PRESSURE: 91 MMHG | SYSTOLIC BLOOD PRESSURE: 126 MMHG

## 2021-05-30 VITALS — SYSTOLIC BLOOD PRESSURE: 118 MMHG | DIASTOLIC BLOOD PRESSURE: 57 MMHG

## 2021-05-30 VITALS — DIASTOLIC BLOOD PRESSURE: 67 MMHG | SYSTOLIC BLOOD PRESSURE: 117 MMHG

## 2021-05-30 VITALS — DIASTOLIC BLOOD PRESSURE: 73 MMHG | SYSTOLIC BLOOD PRESSURE: 134 MMHG

## 2021-05-30 VITALS — SYSTOLIC BLOOD PRESSURE: 127 MMHG | DIASTOLIC BLOOD PRESSURE: 67 MMHG

## 2021-05-30 VITALS — SYSTOLIC BLOOD PRESSURE: 118 MMHG | DIASTOLIC BLOOD PRESSURE: 69 MMHG

## 2021-05-30 VITALS — DIASTOLIC BLOOD PRESSURE: 56 MMHG | SYSTOLIC BLOOD PRESSURE: 113 MMHG

## 2021-05-30 VITALS — DIASTOLIC BLOOD PRESSURE: 72 MMHG | SYSTOLIC BLOOD PRESSURE: 112 MMHG

## 2021-05-30 VITALS — SYSTOLIC BLOOD PRESSURE: 107 MMHG | DIASTOLIC BLOOD PRESSURE: 61 MMHG

## 2021-05-30 VITALS — DIASTOLIC BLOOD PRESSURE: 58 MMHG | SYSTOLIC BLOOD PRESSURE: 107 MMHG

## 2021-05-30 VITALS — DIASTOLIC BLOOD PRESSURE: 71 MMHG | SYSTOLIC BLOOD PRESSURE: 125 MMHG

## 2021-05-30 VITALS — SYSTOLIC BLOOD PRESSURE: 109 MMHG | DIASTOLIC BLOOD PRESSURE: 59 MMHG

## 2021-05-30 VITALS — DIASTOLIC BLOOD PRESSURE: 63 MMHG | SYSTOLIC BLOOD PRESSURE: 122 MMHG

## 2021-05-30 VITALS — SYSTOLIC BLOOD PRESSURE: 109 MMHG | DIASTOLIC BLOOD PRESSURE: 58 MMHG

## 2021-05-30 VITALS — SYSTOLIC BLOOD PRESSURE: 122 MMHG | DIASTOLIC BLOOD PRESSURE: 57 MMHG

## 2021-05-30 VITALS — SYSTOLIC BLOOD PRESSURE: 110 MMHG | DIASTOLIC BLOOD PRESSURE: 58 MMHG

## 2021-05-30 VITALS — DIASTOLIC BLOOD PRESSURE: 76 MMHG | SYSTOLIC BLOOD PRESSURE: 124 MMHG

## 2021-05-30 DIAGNOSIS — O99.013: ICD-10-CM

## 2021-05-30 DIAGNOSIS — Z3A.39: ICD-10-CM

## 2021-05-30 DIAGNOSIS — D64.9: ICD-10-CM

## 2021-05-30 DIAGNOSIS — D62: ICD-10-CM

## 2021-05-30 LAB
BASOPHILS # BLD AUTO: 0 10^3/UL (ref 0–0.1)
BASOPHILS NFR BLD AUTO: 0 % (ref 0–10)
EOSINOPHIL # BLD AUTO: 0.2 10^3/UL (ref 0–0.3)
EOSINOPHIL NFR BLD AUTO: 2 % (ref 0–10)
HCT VFR BLD CALC: 30 % (ref 35–52)
HGB BLD-MCNC: 10.4 G/DL (ref 11.5–16)
LYMPHOCYTES # BLD AUTO: 2.5 10^3/UL (ref 1–4)
LYMPHOCYTES NFR BLD AUTO: 25 % (ref 12–44)
MANUAL DIFFERENTIAL PERFORMED BLD QL: NO
MCH RBC QN AUTO: 29 PG (ref 25–34)
MCHC RBC AUTO-ENTMCNC: 34 G/DL (ref 32–36)
MCV RBC AUTO: 84 FL (ref 80–99)
MONOCYTES # BLD AUTO: 0.8 10^3/UL (ref 0–1)
MONOCYTES NFR BLD AUTO: 9 % (ref 0–12)
NEUTROPHILS # BLD AUTO: 6.3 10^3/UL (ref 1.8–7.8)
NEUTROPHILS NFR BLD AUTO: 63 % (ref 42–75)
PLATELET # BLD: 266 10^3/UL (ref 130–400)
PMV BLD AUTO: 9.7 FL (ref 9–12.2)
WBC # BLD AUTO: 9.9 10^3/UL (ref 4.3–11)

## 2021-05-30 PROCEDURE — 85025 COMPLETE CBC W/AUTO DIFF WBC: CPT

## 2021-05-30 PROCEDURE — 99212 OFFICE O/P EST SF 10 MIN: CPT

## 2021-05-30 PROCEDURE — 0KQM0ZZ REPAIR PERINEUM MUSCLE, OPEN APPROACH: ICD-10-PCS | Performed by: OBSTETRICS & GYNECOLOGY

## 2021-05-30 PROCEDURE — 86900 BLOOD TYPING SEROLOGIC ABO: CPT

## 2021-05-30 PROCEDURE — 36415 COLL VENOUS BLD VENIPUNCTURE: CPT

## 2021-05-30 PROCEDURE — 86850 RBC ANTIBODY SCREEN: CPT

## 2021-05-30 PROCEDURE — 86901 BLOOD TYPING SEROLOGIC RH(D): CPT

## 2021-05-30 RX ADMIN — SODIUM CHLORIDE, SODIUM LACTATE, POTASSIUM CHLORIDE, CALCIUM CHLORIDE, AND DEXTROSE MONOHYDRATE SCH MLS/HR: 600; 310; 30; 20; 5 INJECTION, SOLUTION INTRAVENOUS at 14:23

## 2021-05-30 RX ADMIN — ACETAMINOPHEN SCH MG: 500 TABLET ORAL at 20:27

## 2021-05-30 RX ADMIN — IBUPROFEN SCH MG: 600 TABLET ORAL at 23:35

## 2021-05-30 RX ADMIN — SODIUM CHLORIDE, SODIUM LACTATE, POTASSIUM CHLORIDE, CALCIUM CHLORIDE, AND DEXTROSE MONOHYDRATE SCH MLS/HR: 600; 310; 30; 20; 5 INJECTION, SOLUTION INTRAVENOUS at 06:49

## 2021-05-30 RX ADMIN — IBUPROFEN SCH MG: 600 TABLET ORAL at 18:22

## 2021-05-30 RX ADMIN — DOCUSATE SODIUM SCH MG: 100 CAPSULE ORAL at 20:26

## 2021-05-30 NOTE — HISTORY & PHYSICAL-OB
OB - Chief Complaint & HPI


Date/Time


Date of Admission:


Date of Admission:  May 30, 2021 at 00:54


Date seen by a Provider:  May 30, 2021


Time Seen by a Provider:  08:30





Chief Complaint/History


OB-Reason for Admission/Chief:  Rupture of Membranes


Hx :  6


Hx Para:  4


Expected Date of Delivery:  2021


Gestational Age in Weeks:  39


Gestational Age in Days:  2


Other reason for admission:


This is a 31 year old  at 39 2/7 weeks.  She has been following for PNC with

Dr. Dhillon and then Dr. French for delivery (has seen Dr. French at 16 weeks and 

then at 35 weeks and after).  She presented to women's services with complaint 

of ROM approximately 2 hours prior to admission.  On admission she was not in 

labor.  Cervix was 2 cm dilated, grossly ruptured and 30% effaced.  Fetal 

presenting part was high in the pelvis.  Fetal position was confirmed to be 

cephalic by RN at bedside. She was admitted and was managed expectantly as the 

fetal head was very high in the pelvis and she was not regularly leland.  

she does not wish to have an epidural unless absolutely necessary.  





Patient does have history of LEEP for SURYA II-III.  Had colposcopy in 2020 due

to HSIL on her antepartum pap smear.  No biopsies were taken at that time


Admission Nurse Assessment Rev:  Yes


History of Labs


A+/-


GBS -


VDRL NR





Allergies and Home Medications


Allergies


Coded Allergies:  


     No Known Drug Allergies (Unverified , 17)





Home Medications


Ibuprofen 600 Mg Tablet, 600 MG PO Q6H


   Prescribed by: YONG DHILLON on 19 0710


Prenatal Vit/Iron Fumarate/FA 1 Each Tablet, 1 EACH PO DAILY, (Reported)





OB - History


Delivery History


Hx Blood Disorders:  No


Adverse Rxn to Tranfusion:  No (N/A)





Patient Past Medical History





Healthy; term vaginal deliveries.





Immunizations


Hepatitis A:  Yes


Hepatitis B:  Yes





OB - Admission Exam


Physical Exam


Vitals:





Vital Signs








 21





 10:15 12:00 13:15 14:00


 


Temp  36.8  


 


Pulse    96


 


Resp 16   


 


B/P (MAP)    137/68 (91)


 


Pulse Ox   99 


 


O2 Delivery    Room Air








Labs





Laboratory Tests








Test


 21


00:40 Range/Units


 


 


White Blood Count


 9.9 


 4.3-11.0


10^3/uL


 


Red Blood Count


 3.64 L


 3.80-5.11


10^6/uL


 


Hemoglobin 10.4 L 11.5-16.0  g/dL


 


Hematocrit 30 L 35-52  %


 


Mean Corpuscular Volume 84  80-99  fL


 


Mean Corpuscular Hemoglobin 29  25-34  pg


 


Mean Corpuscular Hemoglobin


Concent 34 


 32-36  g/dL





 


Red Cell Distribution Width 12.0  10.0-14.5  %


 


Platelet Count


 266 


 130-400


10^3/uL


 


Mean Platelet Volume 9.7  9.0-12.2  fL


 


Immature Granulocyte % (Auto) 1   %


 


Neutrophils (%) (Auto) 63  42-75  %


 


Lymphocytes (%) (Auto) 25  12-44  %


 


Monocytes (%) (Auto) 9  0-12  %


 


Eosinophils (%) (Auto) 2  0-10  %


 


Basophils (%) (Auto) 0  0-10  %


 


Neutrophils # (Auto)


 6.3 


 1.8-7.8


10^3/uL


 


Lymphocytes # (Auto)


 2.5 


 1.0-4.0


10^3/uL


 


Monocytes # (Auto)


 0.8 


 0.0-1.0


10^3/uL


 


Eosinophils # (Auto)


 0.2 


 0.0-0.3


10^3/uL


 


Basophils # (Auto)


 0.0 


 0.0-0.1


10^3/uL


 


Immature Granulocyte # (Auto)


 0.1 


 0.0-0.1


10^3/uL

















TABATHA MCLAUGHLIN DO               May 30, 2021 14:35

## 2021-05-30 NOTE — OB LABOR & DELIVERY RECORD
Vag Delivery Note


Vag Delivery Note


Date of Delivery: 21 





Preoperative Diagnosis: Stefany Garcia  is a 31  /Para  6 / 4,Gestational

Age 39 3/7 weeks with SROM





Postoperative Diagnosis: Same


Surgeon: TABATHA MCLAUGHLIN 





Anesthesia: epidural and local for the repair





Delivery Type: vaginal





Findings: 


Viable female infant, apgars pending, weight 93 7 ounces


Lacerations:  1st degre


Intact placenta with 3 vessel cord. No nuchal cord, body cord or shoulder 

dystocia








Estimated Blood Loss: 300 ml





Complications: None





Condition: Stable





Description of Procedure:





The patient is a 31 year old female who presented with srom, not yet in labor. 

She was admitted and informed consent was obtained. Her labor course was 

remarkable for pitocin augmentation and epidural placement. She progressed to 

complete dilatation and began to push. 





She was then set up for delivery. The infant's head was delivered atraumatically

in the BETO position. The shoulders and remainder of the infant's body were then 

delivered without difficulty. Upon delivery, the head was held below the level 

of the perineum and the mouth and nares were bulb suctioned. The cord was doubly

clamped and cut and the infant was handed off to the pediatric staff. An intact 

placenta with 3-vessel cord delivered via [place] and there was found to be 

minimal bleeding.~ Vigorous fundal massage was performed and the fundus was 

found to be firm. IV oxytocin was given. Examination of the vagina and perineum 

revealed a 2nd laceration repaired in the usual fashion with 3-0 vicryl suture. 

Following the repair, sponge, instrument and needle counts were correct. Mom and

baby were both in stable condition in the labor suite.





Vitals - Labs


Vital Signs - I&O





Vital Signs








  Date Time  Temp Pulse Resp B/P (MAP) Pulse Ox O2 Delivery O2 Flow Rate FiO2


 


21 15:30  83  115/59 (77)  Room Air  


 


21 15:15  96 20 117/67 (84)  Room Air  


 


21 15:15      Room Air  


 


21 15:00  96  110/58 (75)  Room Air  


 


21 14:45  96  136/87 (103)  Room Air  


 


21 14:30  85  125/90 (102)  Room Air  


 


21 14:15 37.2 85  108/60 (76)  Room Air  


 


21 14:00  96  137/68 (91)  Room Air  


 


21 13:45  88  124/76 (92)  Room Air  


 


21 13:30  88  124/76 (92)  Room Air  


 


21 13:15  95  100/59 (73) 99   


 


21 13:00  76  107/58 (74) 99   


 


21 12:45  82  96/52 (67) 99   


 


21 12:30  82  112/57 (75) 99   


 


21 12:15  82  127/74 (91) 98   


 


21 12:00 36.8 82  119/67 (84) 99   


 


21 11:45  75   97   


 


21 11:30  75  110/64 (79) 97   


 


21 11:15  80  109/58 (75) 96   


 


21 11:00  75  119/70 (86) 96   


 


21 10:45  76  114/63 (80) 96   


 


21 10:30  74  107/61 (76) 95   


 


21 10:15  85 16 123/59 (80) 97   


 


21 10:05  83  126/57 (80) 95   


 


21 10:01  82  133/58 (83) 96   


 


21 10:00 36.9       


 


21 09:58  95  115/62 (79) 97   


 


21 09:53  87  116/62 (80) 98 Room Air  


 


21 09:47  86 18 112/70 (84) 98 Room Air  


 


21 09:30  92   98 Room Air  


 


21 09:15  88  117/61 (79)  Room Air  


 


21 09:00  81  123/66 (85)  Room Air  


 


21 08:45  81  126/72 (90)  Room Air  


 


21 08:30  86  134/73 (93)  Room Air  


 


21 08:15  88 16 126/91 (103)  Room Air  


 


21 08:00 35.6 88 16 119/59 (79)  Room Air  


 


21 07:45  87 16 122/63 (82)    


 


21 07:30  85 16 118/56 (76)  Room Air  


 


21 07:15  85 18 113/59 (77)  Room Air  


 


21 07:00  80 18 129/68 (88)  Room Air  


 


21 06:45 36.0 82 18 127/67 (87)  Room Air  


 


21 06:30  96 18 126/77 (93)  Room Air  


 


21 06:15  94 18 115/58 (77)  Room Air  


 


21 06:00  88 18 113/56 (75)  Room Air  


 


21 05:45  79 18 110/56 (74)  Room Air  


 


21 05:30  80 18 105/59 (74)  Room Air  


 


21 05:15  80 18 102/58 (73)  Room Air  


 


21 05:00  78 18 108/59 (75)  Room Air  


 


21 04:45  75 18 109/59 (76)  Room Air  


 


21 04:30  97 18 114/59 (77)  Room Air  


 


21 04:15  92 18 118/68 (85)  Room Air  


 


21 04:00  81 18 118/69 (85)  Room Air  


 


21 03:45  81 18 118/69 (85)  Room Air  


 


21 03:30  83 18 134/68 (90)  Room Air  


 


21 03:15  92 18 119/60 (79)  Room Air  


 


21 03:00  83 18 134/68 (90)  Room Air  


 


21 02:45  92 18 113/71 (85)  Room Air  


 


21 02:30  93 18 114/69 (84)  Room Air  


 


21 02:15  92 18 125/71 (89)  Room Air  


 


21 02:00  86 18 112/72 (85)  Room Air  


 


21 01:45  93 18 119/74 (89)  Room Air  


 


21 01:30  88 18 121/72 (88)  Room Air  


 


21 01:15  85 18 110/63 (79)  Room Air  


 


21 00:57  86 18 113/57 (75)  Room Air  


 


21 00:56 36.4 91 20  97 Room Air  











Labs


Laboratory Tests


21 00:40: 


White Blood Count 9.9, Red Blood Count 3.64L, Hemoglobin 10.4L, Hematocrit 30L, 

Mean Corpuscular Volume 84, Mean Corpuscular Hemoglobin 29, Mean Corpuscular 

Hemoglobin Concent 34, Red Cell Distribution Width 12.0, Platelet Count 266, 

Mean Platelet Volume 9.7, Immature Granulocyte % (Auto) 1, Neutrophils (%) 

(Auto) 63, Lymphocytes (%) (Auto) 25, Monocytes (%) (Auto) 9, Eosinophils (%) 

(Auto) 2, Basophils (%) (Auto) 0, Neutrophils # (Auto) 6.3, Lymphocytes # (Auto)

2.5, Monocytes # (Auto) 0.8, Eosinophils # (Auto) 0.2, Basophils # (Auto) 0.0, 

Immature Granulocyte # (Auto) 0.1











TABATHA MCLAUGHLIN DO               May 30, 2021 16:26

## 2021-05-31 VITALS — DIASTOLIC BLOOD PRESSURE: 65 MMHG | SYSTOLIC BLOOD PRESSURE: 120 MMHG

## 2021-05-31 VITALS — DIASTOLIC BLOOD PRESSURE: 61 MMHG | SYSTOLIC BLOOD PRESSURE: 116 MMHG

## 2021-05-31 VITALS — SYSTOLIC BLOOD PRESSURE: 126 MMHG | DIASTOLIC BLOOD PRESSURE: 69 MMHG

## 2021-05-31 VITALS — DIASTOLIC BLOOD PRESSURE: 67 MMHG | SYSTOLIC BLOOD PRESSURE: 121 MMHG

## 2021-05-31 VITALS — SYSTOLIC BLOOD PRESSURE: 105 MMHG | DIASTOLIC BLOOD PRESSURE: 57 MMHG

## 2021-05-31 LAB
BASOPHILS # BLD AUTO: 0 10^3/UL (ref 0–0.1)
BASOPHILS NFR BLD AUTO: 0 % (ref 0–10)
EOSINOPHIL # BLD AUTO: 0.2 10^3/UL (ref 0–0.3)
EOSINOPHIL NFR BLD AUTO: 1 % (ref 0–10)
HCT VFR BLD CALC: 30 % (ref 35–52)
HGB BLD-MCNC: 9.9 G/DL (ref 11.5–16)
LYMPHOCYTES # BLD AUTO: 2.6 10^3/UL (ref 1–4)
LYMPHOCYTES NFR BLD AUTO: 18 % (ref 12–44)
MANUAL DIFFERENTIAL PERFORMED BLD QL: NO
MCH RBC QN AUTO: 28 PG (ref 25–34)
MCHC RBC AUTO-ENTMCNC: 34 G/DL (ref 32–36)
MCV RBC AUTO: 85 FL (ref 80–99)
MONOCYTES # BLD AUTO: 0.9 10^3/UL (ref 0–1)
MONOCYTES NFR BLD AUTO: 6 % (ref 0–12)
NEUTROPHILS # BLD AUTO: 10.7 10^3/UL (ref 1.8–7.8)
NEUTROPHILS NFR BLD AUTO: 74 % (ref 42–75)
PLATELET # BLD: 238 10^3/UL (ref 130–400)
PMV BLD AUTO: 9.6 FL (ref 9–12.2)
WBC # BLD AUTO: 14.6 10^3/UL (ref 4.3–11)

## 2021-05-31 RX ADMIN — DOCUSATE SODIUM SCH MG: 100 CAPSULE ORAL at 10:19

## 2021-05-31 RX ADMIN — DOCUSATE SODIUM SCH MG: 100 CAPSULE ORAL at 21:42

## 2021-05-31 RX ADMIN — ACETAMINOPHEN SCH MG: 500 TABLET ORAL at 03:31

## 2021-05-31 RX ADMIN — IBUPROFEN SCH MG: 600 TABLET ORAL at 18:29

## 2021-05-31 RX ADMIN — FERROUS SULFATE TAB 325 MG (65 MG ELEMENTAL FE) SCH MG: 325 (65 FE) TAB at 10:19

## 2021-05-31 RX ADMIN — VITAMIN A ACETATE, .BETA.-CAROTENE, ASCORBIC ACID, CHOLECALCIFEROL, .ALPHA.-TOCOPHEROL ACETATE, DL-, THIAMINE MONONITRATE, RIBOFLAVIN, NIACINAMIDE, PYRIDOXINE HYDROCHLORIDE, FOLIC ACID, CYANOCOBALAMIN, CALCIUM CARBONATE, FERROUS FUMARATE, ZINC OXIDE, AND CUPRIC OXIDE SCH EA: 2000; 2000; 120; 400; 22; 1.84; 3; 20; 10; 1; 12; 200; 27; 25; 2 TABLET ORAL at 10:19

## 2021-05-31 RX ADMIN — IBUPROFEN SCH MG: 600 TABLET ORAL at 06:35

## 2021-05-31 RX ADMIN — IBUPROFEN SCH MG: 600 TABLET ORAL at 12:18

## 2021-05-31 RX ADMIN — ACETAMINOPHEN SCH MG: 500 TABLET ORAL at 12:19

## 2021-05-31 RX ADMIN — ACETAMINOPHEN SCH MG: 500 TABLET ORAL at 21:42

## 2021-05-31 NOTE — POSTPARTUM PROGRESS NOTE
Postpartum Note


Postpartum Note


Postpartum Day # 1 s/p 





Subjective:


Patient is without complaints. Ambulating, voiding. Tolerating a regular diet w

ithout nausea or vomiting. Normal lochia. Pain is well controlled with oral pain

medications. [] feeding. []





Circular lesions with central clearing noticed on her foot at delivery.  she 

states she had been given a nystatin powder to use on it bc was told it is a 

fungus.  However, she has other lesions and these appear to be tinea.  Treated 

with clortrimazole.  However, initially supervisor brought up 

betamethasone/clortrimazole.  This works as well, but will send with rx for 

clotrimazole.  Nystatin does not treat tinea.  Would be effective for the 

creases which are likely fungal. 


 


Objective:











 21





 03:31 10:19


 


Temp 36.2 36.5


 


Pulse 91 79


 


Resp 18 18


 


B/P (MAP) 105/57 (73) 121/67 (85)


 


Pulse Ox 98 99


 


O2 Delivery Room Air Room Air














 21





 00:00


 


Intake Total 2250 ml


 


Balance 2250 ml








Laboratory Tests








Test


 21


03:55 Range/Units


 


 


White Blood Count


 14.6 H


 4.3-11.0


10^3/uL


 


Red Blood Count


 3.49 L


 3.80-5.11


10^6/uL


 


Hemoglobin 9.9 L 11.5-16.0  g/dL


 


Hematocrit 30 L 35-52  %


 


Mean Corpuscular Volume 85  80-99  fL


 


Mean Corpuscular Hemoglobin 28  25-34  pg


 


Mean Corpuscular Hemoglobin


Concent 34 


 32-36  g/dL





 


Red Cell Distribution Width 12.2  10.0-14.5  %


 


Platelet Count


 238 


 130-400


10^3/uL


 


Mean Platelet Volume 9.6  9.0-12.2  fL


 


Immature Granulocyte % (Auto) 1   %


 


Neutrophils (%) (Auto) 74  42-75  %


 


Lymphocytes (%) (Auto) 18  12-44  %


 


Monocytes (%) (Auto) 6  0-12  %


 


Eosinophils (%) (Auto) 1  0-10  %


 


Basophils (%) (Auto) 0  0-10  %


 


Neutrophils # (Auto)


 10.7 H


 1.8-7.8


10^3/uL


 


Lymphocytes # (Auto)


 2.6 


 1.0-4.0


10^3/uL


 


Monocytes # (Auto)


 0.9 


 0.0-1.0


10^3/uL


 


Eosinophils # (Auto)


 0.2 


 0.0-0.3


10^3/uL


 


Basophils # (Auto)


 0.0 


 0.0-0.1


10^3/uL


 


Immature Granulocyte # (Auto)


 0.1 


 0.0-0.1


10^3/uL











Physical Exam:


General - Alert and oriented, no apparent distress


Abdomen - Soft, appropriately tender to palpation, non-distended, fundus firm at

 umbilicus


Extremities - no edema, negative Lorenza's bilaterally 








Assessment:


1. post-partum day # 1, status post spontaneous vaginal delivery.


Recovering well, hemodynamically stable


2.  Acute blood loss anemia - iron started


3.  dermatophyte dermatitis


4.  intertrigal candida








Plan:


Routine postpartum care.


Encourage breast feeding.


Encourage ambulation.


Ferrous sulfate supplementation.


Plan for discharge





Vitals - Labs


Vital Signs - I&O





Vital Signs








  Date Time  Temp Pulse Resp B/P (MAP) Pulse Ox O2 Delivery O2 Flow Rate FiO2


 


21 10:19 36.5 79 18 121/67 (85) 99 Room Air  


 


21 03:31 36.2 91 18 105/57 (73) 98 Room Air  


 


21 23:35 36.3 77 18 99/55 (70) 98 Room Air  


 


21 20:26 36.9 90 18 118/66 (83) 98 Room Air  


 


21 18:15  90  115/59 (77)    


 


21 18:00  100  128/69 (88)    


 


21 17:45  98  110/56 (74)    


 


21 17:30 37.6 101  116/60 (78)    


 


21 17:15  97  121/58 (79)    


 


21 17:00  107  122/57 (78)    


 


21 16:45  105 16 118/57 (77)  Room Air  


 


21 16:30  100  128/68 (88)  Room Air  


 


21 16:00  91  122/66 (84)  Room Air  


 


21 15:45  83  122/69 (86)  Room Air  


 


21 15:30  83  115/59 (77)  Room Air  


 


21 15:15  96 20 117/67 (84)  Room Air  


 


21 15:15      Room Air  


 


21 15:00  96  110/58 (75)  Room Air  


 


21 14:45  96  136/87 (103)  Room Air  


 


21 14:30  85  125/90 (102)  Room Air  


 


21 14:15 37.2 85  108/60 (76)  Room Air  


 


21 14:00  96  137/68 (91)  Room Air  


 


21 13:45  88  124/76 (92)  Room Air  


 


21 13:30  88  124/76 (92)  Room Air  














I & O 


 


 21





 07:00


 


Intake Total 3250 ml


 


Balance 3250 ml











Labs


Laboratory Tests


21 03:55: 


White Blood Count 14.6H, Red Blood Count 3.49L, Hemoglobin 9.9L, Hematocrit 30L,

 Mean Corpuscular Volume 85, Mean Corpuscular Hemoglobin 28, Mean Corpuscular H

emoglobin Concent 34, Red Cell Distribution Width 12.2, Platelet Count 238, Mean

 Platelet Volume 9.6, Immature Granulocyte % (Auto) 1, Neutrophils (%) (Auto) 

74, Lymphocytes (%) (Auto) 18, Monocytes (%) (Auto) 6, Eosinophils (%) (Auto) 1,

 Basophils (%) (Auto) 0, Neutrophils # (Auto) 10.7H, Lymphocytes # (Auto) 2.6, 

Monocytes # (Auto) 0.9, Eosinophils # (Auto) 0.2, Basophils # (Auto) 0.0, 

Immature Granulocyte # (Auto) 0.1











TABATHA MCLAUGHLIN DO               May 31, 2021 13:27

## 2021-05-31 NOTE — DISCHARGE INST-WOMEN'S SERVICE
Discharge Inst-Women's Serv


Depart Medication/Instructions


New, Converted or Re-Newed RX:  Transmitted to Pharmacy


Final Diagnosis


vaginal delivery


antepartum and acute blood loss anemia


history of LEEP


SURYA II/III


Problems Reviewed?:  Yes





Consults/Follow Up


Additional Follow Up:  Yes (schedule post partum exam with Dr. mclaughlin for 

colposcopy and possible LEEP.  4-6 weeks post partum)





Activity


Activity:  Activity as Tolerated


Driving Instructions:  You May Drive


NO SMOKING:  NO SMOKING


Nothing Inside Vagina:  No Douching, No [place], No Tampons





Diet


Discharge Diet:  No Restrictions


Symptoms to Report to :  Bleeding Excessive, Pain Increased, Fever Over 101 

Degrees F, Vaginal Bleeding Increase, Cramps in Feet or Legs, Vaginal Discharge 

Foul


For Any Problems or Questions:  Contact Your Physician











TABATHA MCLAUGHLIN DO               May 31, 2021 13:29

## 2021-06-01 VITALS — SYSTOLIC BLOOD PRESSURE: 109 MMHG | DIASTOLIC BLOOD PRESSURE: 63 MMHG

## 2021-06-01 VITALS — DIASTOLIC BLOOD PRESSURE: 75 MMHG | SYSTOLIC BLOOD PRESSURE: 117 MMHG

## 2021-06-01 RX ADMIN — IBUPROFEN SCH MG: 600 TABLET ORAL at 08:22

## 2021-06-01 RX ADMIN — ACETAMINOPHEN SCH MG: 500 TABLET ORAL at 06:29

## 2021-06-01 RX ADMIN — IBUPROFEN SCH MG: 600 TABLET ORAL at 01:10

## 2021-06-01 RX ADMIN — VITAMIN A ACETATE, .BETA.-CAROTENE, ASCORBIC ACID, CHOLECALCIFEROL, .ALPHA.-TOCOPHEROL ACETATE, DL-, THIAMINE MONONITRATE, RIBOFLAVIN, NIACINAMIDE, PYRIDOXINE HYDROCHLORIDE, FOLIC ACID, CYANOCOBALAMIN, CALCIUM CARBONATE, FERROUS FUMARATE, ZINC OXIDE, AND CUPRIC OXIDE SCH EA: 2000; 2000; 120; 400; 22; 1.84; 3; 20; 10; 1; 12; 200; 27; 25; 2 TABLET ORAL at 08:22

## 2021-06-01 RX ADMIN — DOCUSATE SODIUM SCH MG: 100 CAPSULE ORAL at 08:22

## 2021-06-01 RX ADMIN — FERROUS SULFATE TAB 325 MG (65 MG ELEMENTAL FE) SCH MG: 325 (65 FE) TAB at 08:22

## 2022-02-25 ENCOUNTER — HOSPITAL ENCOUNTER (OUTPATIENT)
Dept: HOSPITAL 75 - LABNPT | Age: 32
End: 2022-02-25
Attending: FAMILY MEDICINE
Payer: MEDICAID

## 2022-02-25 DIAGNOSIS — Z01.419: Primary | ICD-10-CM

## 2022-02-25 PROCEDURE — 87210 SMEAR WET MOUNT SALINE/INK: CPT

## 2022-03-03 ENCOUNTER — HOSPITAL ENCOUNTER (OUTPATIENT)
Dept: HOSPITAL 75 - LABNPT | Age: 32
End: 2022-03-03
Attending: FAMILY MEDICINE
Payer: MEDICAID

## 2022-03-03 DIAGNOSIS — Z20.822: Primary | ICD-10-CM

## 2022-03-03 PROCEDURE — 87636 SARSCOV2 & INF A&B AMP PRB: CPT
